# Patient Record
Sex: MALE | Race: WHITE | NOT HISPANIC OR LATINO | URBAN - METROPOLITAN AREA
[De-identification: names, ages, dates, MRNs, and addresses within clinical notes are randomized per-mention and may not be internally consistent; named-entity substitution may affect disease eponyms.]

---

## 2019-08-27 ENCOUNTER — EMERGENCY (EMERGENCY)
Facility: HOSPITAL | Age: 54
LOS: 1 days | Discharge: HOME | End: 2019-08-27
Attending: EMERGENCY MEDICINE
Payer: SELF-PAY

## 2019-08-27 VITALS
TEMPERATURE: 97 F | OXYGEN SATURATION: 97 % | DIASTOLIC BLOOD PRESSURE: 78 MMHG | SYSTOLIC BLOOD PRESSURE: 120 MMHG | HEART RATE: 56 BPM | RESPIRATION RATE: 16 BRPM

## 2019-08-27 VITALS
HEART RATE: 61 BPM | SYSTOLIC BLOOD PRESSURE: 121 MMHG | RESPIRATION RATE: 20 BRPM | HEIGHT: 69 IN | OXYGEN SATURATION: 99 % | TEMPERATURE: 98 F | DIASTOLIC BLOOD PRESSURE: 80 MMHG | WEIGHT: 164.91 LBS

## 2019-08-27 LAB
ALBUMIN SERPL ELPH-MCNC: 4.6 G/DL — SIGNIFICANT CHANGE UP (ref 3.3–5)
ALP SERPL-CCNC: 61 U/L — SIGNIFICANT CHANGE UP (ref 40–120)
ALT FLD-CCNC: 33 U/L — SIGNIFICANT CHANGE UP (ref 10–45)
ANION GAP SERPL CALC-SCNC: 8 MMOL/L — SIGNIFICANT CHANGE UP (ref 5–17)
APTT BLD: 36.7 SEC — HIGH (ref 27.5–36.3)
AST SERPL-CCNC: 18 U/L — SIGNIFICANT CHANGE UP (ref 10–40)
BASOPHILS # BLD AUTO: 0.1 K/UL — SIGNIFICANT CHANGE UP (ref 0–0.2)
BASOPHILS NFR BLD AUTO: 0.6 % — SIGNIFICANT CHANGE UP (ref 0–2)
BILIRUB SERPL-MCNC: 0.4 MG/DL — SIGNIFICANT CHANGE UP (ref 0.2–1.2)
BUN SERPL-MCNC: 20 MG/DL — SIGNIFICANT CHANGE UP (ref 7–23)
CALCIUM SERPL-MCNC: 9.6 MG/DL — SIGNIFICANT CHANGE UP (ref 8.4–10.5)
CHLORIDE SERPL-SCNC: 104 MMOL/L — SIGNIFICANT CHANGE UP (ref 96–108)
CO2 SERPL-SCNC: 28 MMOL/L — SIGNIFICANT CHANGE UP (ref 22–31)
CREAT SERPL-MCNC: 1.1 MG/DL — SIGNIFICANT CHANGE UP (ref 0.5–1.3)
EOSINOPHIL # BLD AUTO: 0.1 K/UL — SIGNIFICANT CHANGE UP (ref 0–0.5)
EOSINOPHIL NFR BLD AUTO: 1.6 % — SIGNIFICANT CHANGE UP (ref 0–6)
GLUCOSE SERPL-MCNC: 93 MG/DL — SIGNIFICANT CHANGE UP (ref 70–99)
HCT VFR BLD CALC: 46.1 % — SIGNIFICANT CHANGE UP (ref 39–50)
HGB BLD-MCNC: 15.4 G/DL — SIGNIFICANT CHANGE UP (ref 13–17)
INR BLD: 0.97 RATIO — SIGNIFICANT CHANGE UP (ref 0.88–1.16)
LYMPHOCYTES # BLD AUTO: 2.3 K/UL — SIGNIFICANT CHANGE UP (ref 1–3.3)
LYMPHOCYTES # BLD AUTO: 25.2 % — SIGNIFICANT CHANGE UP (ref 13–44)
MCHC RBC-ENTMCNC: 29.8 PG — SIGNIFICANT CHANGE UP (ref 27–34)
MCHC RBC-ENTMCNC: 33.5 GM/DL — SIGNIFICANT CHANGE UP (ref 32–36)
MCV RBC AUTO: 89 FL — SIGNIFICANT CHANGE UP (ref 80–100)
MONOCYTES # BLD AUTO: 0.6 K/UL — SIGNIFICANT CHANGE UP (ref 0–0.9)
MONOCYTES NFR BLD AUTO: 7 % — SIGNIFICANT CHANGE UP (ref 2–14)
NEUTROPHILS # BLD AUTO: 6 K/UL — SIGNIFICANT CHANGE UP (ref 1.8–7.4)
NEUTROPHILS NFR BLD AUTO: 65.6 % — SIGNIFICANT CHANGE UP (ref 43–77)
PLATELET # BLD AUTO: 244 K/UL — SIGNIFICANT CHANGE UP (ref 150–400)
POTASSIUM SERPL-MCNC: 4.3 MMOL/L — SIGNIFICANT CHANGE UP (ref 3.5–5.3)
POTASSIUM SERPL-SCNC: 4.3 MMOL/L — SIGNIFICANT CHANGE UP (ref 3.5–5.3)
PROT SERPL-MCNC: 6.9 G/DL — SIGNIFICANT CHANGE UP (ref 6–8.3)
PROTHROM AB SERPL-ACNC: 11.1 SEC — SIGNIFICANT CHANGE UP (ref 10–12.9)
RBC # BLD: 5.18 M/UL — SIGNIFICANT CHANGE UP (ref 4.2–5.8)
RBC # FLD: 11.9 % — SIGNIFICANT CHANGE UP (ref 10.3–14.5)
SODIUM SERPL-SCNC: 140 MMOL/L — SIGNIFICANT CHANGE UP (ref 135–145)
WBC # BLD: 9.1 K/UL — SIGNIFICANT CHANGE UP (ref 3.8–10.5)
WBC # FLD AUTO: 9.1 K/UL — SIGNIFICANT CHANGE UP (ref 3.8–10.5)

## 2019-08-27 PROCEDURE — 70450 CT HEAD/BRAIN W/O DYE: CPT | Mod: 26

## 2019-08-27 PROCEDURE — 85610 PROTHROMBIN TIME: CPT

## 2019-08-27 PROCEDURE — 85027 COMPLETE CBC AUTOMATED: CPT

## 2019-08-27 PROCEDURE — 80053 COMPREHEN METABOLIC PANEL: CPT

## 2019-08-27 PROCEDURE — 85730 THROMBOPLASTIN TIME PARTIAL: CPT

## 2019-08-27 PROCEDURE — 99284 EMERGENCY DEPT VISIT MOD MDM: CPT | Mod: 25

## 2019-08-27 PROCEDURE — 93005 ELECTROCARDIOGRAM TRACING: CPT

## 2019-08-27 PROCEDURE — 99284 EMERGENCY DEPT VISIT MOD MDM: CPT

## 2019-08-27 PROCEDURE — 93010 ELECTROCARDIOGRAM REPORT: CPT

## 2019-08-27 PROCEDURE — 70450 CT HEAD/BRAIN W/O DYE: CPT

## 2019-08-27 PROCEDURE — 82962 GLUCOSE BLOOD TEST: CPT

## 2019-08-27 NOTE — ED PROVIDER NOTE - PROGRESS NOTE DETAILS
Code stroke @920, patient at CT scan, evaluated by neurology. Pt stable, awaiting dispo by neurology Patient remains asymptomatic, headache minimal, reporting does not require pain medications.  Discussed with stroke team, he is cleared for discharge with outpatient follow up, patient comfortable with this.  Labs and CT unremarkable.  Riley Durán M.D.

## 2019-08-27 NOTE — CONSULT NOTE ADULT - ASSESSMENT
Assessment:  54y R-handed White M presenting as code stroke for B/L blurred vision lasting 1 hour with 2/10 non-positional B/L frontal HA and resolved symptoms.  Likely migraine presentation.  CT head normal.  No need for CTA head and neck or MRI given clinical presentation.      NIHSS: 0  MRS: 0  Not a tPA candidate due to resolved symptoms and such low NIHSS  Not a thrombectomy candidate due to resolved symptoms and NIHSS <6    Plan  -Outpatient follow up with Dr. Kevin.   -No need for MRI brain at this time.  If symptoms worsen, would suggest MRI for further characterization  -If HA persists may try 2 gram Magnesium, 30 mg Toradol, and/or Reglan.     Assessment and plan discussed with the attending, Dr. Herberth Ellis, DO  PGY-2 Neurology Service

## 2019-08-27 NOTE — ED PROVIDER NOTE - PATIENT PORTAL LINK FT
You can access the FollowMyHealth Patient Portal offered by [facility] by registering at the following website: [email or web address]. By joining [employer]’s FollowMyHealth portal, you will also be able to view your health information using other applications (apps) compatible with our system.

## 2019-08-27 NOTE — ED PROVIDER NOTE - NSFOLLOWUPINSTRUCTIONS_ED_ALL_ED_FT
Take tylenol and motrin at home for headaches.  Take a baby aspirin daily for stroke prevention.  Return to the Emergency Department immediately for loss of vision, the worst headache of your life, slurred speech, inability to move or feel an arm or leg, facial droop, inability to walk or maintain your balance or for any other concerning symptoms.

## 2019-08-27 NOTE — ED ADULT NURSE NOTE - OBJECTIVE STATEMENT
code stroke: (Please refer to code stroke flow sheet): 53 y/o male c/o light in his vision from 07:50am-08:50am.   Pt reports in both eyes, not going away in any position or closing/opening of one / both eyes.  Pt reports headache then started all over his head.   Pt reports no history of migraine, no trauma.  No chest pain, no SOB, no dizziness, no weakness, no fever, no chills.  All extremities mobile.  Good strength. code stroke: (Please refer to code stroke flow sheet): 53 y/o male c/o light in his vision from 07:50am-08:50am.   Pt reports in both eyes, not going away in any position or closing/opening of one / both eyes.  Pt reports headache then started all over his head.  Resolved shortly prior to arrival.  Pt reports no history of migraine, no trauma.  No chest pain, no SOB, no dizziness, no weakness, no fever, no chills.  All extremities mobile.  Good strength. code stroke: (Please refer to code stroke flow sheet): 55 y/o male c/o light in his vision from 07:50am-08:50am.   Pt reports in both eyes, not going away in any position or closing/opening of one / both eyes.  Report his symptoms resolved.  Pt reports headache then started all over his head.  Pt reports no history of migraine, no trauma.  No chest pain, no SOB, no dizziness, no weakness, no fever, no chills.  All extremities mobile.  Good strength.

## 2019-08-27 NOTE — ED PROVIDER NOTE - CARE PROVIDER_API CALL
Tam Kevin (DO)  Neurology; Vascular Neurology  [address]  [place] [address]  Phone: [phone]  Fax: [phone]  Follow Up Time: 1-3 Days

## 2019-08-27 NOTE — ED PROVIDER NOTE - NS ED ROS FT
ROS: denies weakness, dizziness, fevers/chills, nausea/vomiting, chest pain, SOB, diaphoresis, abdominal pain, diarrhea, joint pain, dysuria/hematuria, rash    +light in vision, HA

## 2019-08-27 NOTE — CONSULT NOTE ADULT - SUBJECTIVE AND OBJECTIVE BOX
NEUROLOGY CONSULT   HPI: Mr. Bradley Jauregui is a 54y R-handed White man with a PMH of GERD who presented on 08-27-19 as a code stroke for B/L blurred peripheral vision which had resolved.  LKW of 07:55.  Shortly after started having symptoms with 2/10 non-positional B/L frontal headache.  Has not had headaches in the past, but admits to migraines in 2/4 children.  No other symptoms and his vision had returned to normal after 1 hour.  CT head was done which was normal and had NIHSS of 0.       ROS: A 10-system ROS was performed and is negative except for those items noted above.     PMH:  GERD    Home Medications:  Famotidine     ALLERGIES: penicillin (Stomach Upset)    Social History: Cigars weekly, occasional EtOH, no drugs.     FH:  No strokes or hypercoagulable diseases. 2/4 children with migraines.       OBJECTIVE  Vital Signs Last 24 Hrs  T(C): 36.7 (27 Aug 2019 09:14), Max: 36.7 (27 Aug 2019 09:14)  T(F): 98.1 (27 Aug 2019 09:14), Max: 98.1 (27 Aug 2019 09:14)  HR: 61 (27 Aug 2019 09:14) (61 - 61)  BP: 121/80 (27 Aug 2019 09:14) (121/80 - 121/80)  BP(mean): --  RR: 20 (27 Aug 2019 09:14) (20 - 20)  SpO2: 99% (27 Aug 2019 09:14) (99% - 99%)  I&O's Summary    PHYSICAL EXAM:  General: Obese male appears stated age, in NAD  MSK: No erythema, tenderness or deformities.   Skin: No rashes, lesions or color changes.  Neurologic:  Mental Status: Awake, alert, oriented to person, place, situation, time. Normal affect. Follows all commands.  Language: Speech is clear, fluent with preserved naming, repetition and comprehension.    Cranial Nerves: PERRLA (R = 3mm, L = 3mm). Visual fields intact. EOMI no nystagmus.  Fundoscopic exam normal B/L.  Vision 20/20 B/L.  No color desaturation.  V1-3 intact to light touch.  No facial asymmetry b/l, full eye closure strength b/l. Hearing grossly normal to conversation.  Symmetric palate elevation in midline.  Head turning & shoulder shrug intact b/l. Tongue midline, normal movements, no atrophy.  Motor: Normal muscle bulk & tone. No noticeable tremor, myoclonus or pronator drift. ***/5 strength.	  Sensation: Symmetric B/L preserved sensation to light touch, pin prick, position.    Cortical: No extinction on double simultaneous touch and no signs of neglect.   Reflexes: 3/4 throughout UE and LE and symmetric.  Plantar Responses are downgoing.   Coordination: Intact rapid-alternating movements. No dysmetria on finger-to-nose or heel-to-shin.  No dysdiadochokinesia  Gait: Normal stance, stride length, touch off, arm swing and otto.   Normal Romberg. No postural instability.   Psychiatric: Normal mood and congruent affect.     CBC:             15.4   9.1   )-----------( 244      ( 27 Aug 2019 09:31 )             46.1     CMP:  08-27    140  |  104  |  20  ----------------------------<  93  4.3   |  28  |  1.10    Ca    9.6      27 Aug 2019 09:31    TPro  6.9  /  Alb  4.6  /  TBili  0.4  /  DBili  x   /  AST  18  /  ALT  33  /  AlkPhos  61  08-27    COAGS:  PT/INR - ( 27 Aug 2019 09:31 )   PT: 11.1 sec;   INR: 0.97 ratio      < from: CT Brain Stroke Protocol (08.27.19 @ 09:34) >    IMPRESSION: Unremarkable noncontrast CT of the brain.    < end of copied text >

## 2019-08-27 NOTE — ED PROVIDER NOTE - CLINICAL SUMMARY MEDICAL DECISION MAKING FREE TEXT BOX
light in vision transiently for 1 hour, followed by mild HA. Ddx include stroke vs TIA vs SAH vs migraine. Code stroke called in setting of new onset light in vision at 750 this AM, resolved.

## 2019-08-27 NOTE — ED PROVIDER NOTE - ATTENDING CONTRIBUTION TO CARE
Patient code stroke activation from triage.    Approx one hour ago noted change in vision in bilateral eyes - shimmering bilaterally.  Lasted approx 1 hour and then resolved.  Resolved shortly prior to arrival.  Note to have symptoms in both eyes.  Headache (2/10) began afterwards.  No history of headaches/migranes in past.  No associated slurred speech, sensation changes, weakness.  Denying chest pains, shortness of breath, abdominal pains.    A 14 point review of systems is negative except as in HPI or otherwise documented.    Exam:  General: Patient well appearing, vital signs within normal limits  HEENT: airway patent with moist mucous membranes  Cardiac: RRR S1/S2 with strong peripheral pulses  Respiratory: lungs clear without respiratory distress  GI: abdomen soft, non tender, non distended  Neuro: CN II-XII intact, normal strength, sensation, coordination, ambulation, vision grossly normal  Skin: warm, well perfused  Psych: normal mood and affect    NIHSS: 0    Likely migrane headache, however given age and first presentation will obtain imaging and labs per code stroke protocol, neurology evaluation, headache treatment if needed.

## 2019-08-27 NOTE — ED ADULT TRIAGE NOTE - CHIEF COMPLAINT QUOTE
blurred vision for an hour this morning blurred vision for an hour this morning, stroke called at 0920